# Patient Record
Sex: FEMALE | Race: BLACK OR AFRICAN AMERICAN | NOT HISPANIC OR LATINO | URBAN - METROPOLITAN AREA
[De-identification: names, ages, dates, MRNs, and addresses within clinical notes are randomized per-mention and may not be internally consistent; named-entity substitution may affect disease eponyms.]

---

## 2024-08-17 ENCOUNTER — EMERGENCY (EMERGENCY)
Facility: HOSPITAL | Age: 29
LOS: 1 days | Discharge: HOME | End: 2024-08-17
Admitting: EMERGENCY MEDICINE
Payer: SELF-PAY

## 2024-08-17 VITALS
SYSTOLIC BLOOD PRESSURE: 117 MMHG | WEIGHT: 179.9 LBS | HEART RATE: 74 BPM | HEIGHT: 62 IN | TEMPERATURE: 98 F | DIASTOLIC BLOOD PRESSURE: 79 MMHG | OXYGEN SATURATION: 100 % | RESPIRATION RATE: 16 BRPM

## 2024-08-17 DIAGNOSIS — H92.03 OTALGIA, BILATERAL: ICD-10-CM

## 2024-08-17 PROCEDURE — 99283 EMERGENCY DEPT VISIT LOW MDM: CPT

## 2024-08-17 NOTE — ED PROVIDER NOTE - PATIENT PORTAL LINK FT
You can access the FollowMyHealth Patient Portal offered by [facility] by registering at the following website: [email or web address]. By joining [employer]’s FollowMyHealth portal, you will also be able to view your health information using other applications (apps) compatible with our system.

## 2024-08-17 NOTE — ED PROVIDER NOTE - NS ED MD DISPO DISCHARGE
Hi Anju,    The book I mentioned about menopause is called \"The Menopause Manifesto\", written by Dr. Daniela Montilla.  It was a pleasure to meet you today.  Let me know if you need anything!    Take care,  Dr. Castañeda   Home

## 2024-08-17 NOTE — ED ADULT NURSE NOTE - BREATHING
Ventricular Rate : 107   Atrial Rate : 111   P-R Interval : 144   QRS Duration : 68   Q-T Interval : 498   QTC Calculation(Bezet) : 664   P Axis : 62   R Axis : 65   T Axis : 52   Diagnosis : Sinus tachycardia~Nonspecific T wave abnormality~Abnormal ECG~No previous ECGs available~Confirmed by SINAN TOMLIN IMRAN ([id number]) on 2/21/2017 9:53:12 AM      spontaneous/unlabored

## 2024-08-17 NOTE — ED PROVIDER NOTE - OBJECTIVE STATEMENT
30 yo female c/o ear pain over the past 3 days. states pain initially was in right ear and she had small amount of clear drainage from that ear with some mild swelling to lymph node near right ear, now pain is mostly to left ear, no drainage to left ear. denies history of recent swimming or barotrauma.

## 2024-08-17 NOTE — ED PROVIDER NOTE - PHYSICAL EXAMINATION
CONSTITUTIONAL: Well-appearing; well-nourished; in no apparent distress.   	HEAD: Normocephalic; atraumatic.   	EYES:  conjunctiva and sclera clear  	ENT: right ear erythema to ear canal, hazy TM, no drainage. pain with manipulation of right ear. +right ear LAD.   left ear erythema to ear canal, small amount of cerumen impaction. hazy TM. no drainage. pain with manipulation of left ear.  moist mucous membranes, normal dentition, no erythema, no swelling, no exudates, no drooling, no PTA, uvula midline   	SKIN: Normal for age and race; warm; dry; good turgor; no apparent lesions or rash.   	NEURO: A & O x 3; face symmetric; grossly unremarkable.   PSYCHOLOGICAL: The patient’s mood and manner are appropriate.

## 2024-08-17 NOTE — ED PROVIDER NOTE - NSFOLLOWUPINSTRUCTIONS_ED_ALL_ED_FT
Please take antibiotics as prescribed  For pain, Take Tylenol 650mg (Two regular strength 325 mg pills) every 4-6 hours or two extra strength 500mg tablets every 8 hours as needed for pain or fevers. Do not exceed 1000mg at one time or 4000mg in a 24 hour period. Take Motrin (also sold as Advil or Ibuprofen) 400-600 mg (two or three 200 mg over the counter pills) every 8 hours as needed for moderate pain or fevers-- take with food; do not take for more than 5 consecutive days.    Please follow up with you ENT.     SEEK IMMEDIATE MEDICAL CARE IF YOU HAVE ANY OF THE FOLLOWING SYMPTOMS: pain that is not controlled with medicine, swelling/redness/pain around your ear, facial paralysis, tenderness of the bone behind your ear when you touch it, neck lump or neck stiffness.

## 2024-08-17 NOTE — ED PROVIDER NOTE - CARE PROVIDER_API CALL
Javon Reynolds  Otolaryngology  [address]  [place] [address]  Phone: [phone]  Fax: [phone]  Follow Up Time:

## 2024-08-17 NOTE — ED ADULT NURSE NOTE - CHPI ED NUR SYMPTOMS NEG
no fever/no bleeding gums/no chills/no fever/no loss of consciousness/no nausea/no numbness/no syncope/no vomiting/no weakness